# Patient Record
Sex: MALE | Race: WHITE | NOT HISPANIC OR LATINO | URBAN - METROPOLITAN AREA
[De-identification: names, ages, dates, MRNs, and addresses within clinical notes are randomized per-mention and may not be internally consistent; named-entity substitution may affect disease eponyms.]

---

## 2017-01-30 PROBLEM — 428283002 HISTORY OF POLYP OF COLON: Status: ACTIVE | Noted: 2017-01-30

## 2017-01-30 PROBLEM — 64859006 OSTEOPOROSIS: Status: ACTIVE | Noted: 2017-01-30

## 2017-01-30 PROBLEM — 398050005 DIVERTICULAR DISEASE OF COLON: Status: ACTIVE | Noted: 2017-01-30

## 2017-01-30 PROBLEM — 95570007 KIDNEY STONE: Status: ACTIVE | Noted: 2017-01-30

## 2019-07-24 PROBLEM — 13644009 HYPERCHOLESTEROLEMIA: Status: ACTIVE | Noted: 2019-07-24

## 2019-07-24 PROBLEM — 9014002 PSORIASIS: Status: ACTIVE | Noted: 2019-07-24

## 2021-02-11 ENCOUNTER — OFFICE VISIT (OUTPATIENT)
Dept: URBAN - METROPOLITAN AREA CLINIC 44 | Facility: CLINIC | Age: 71
End: 2021-02-11

## 2021-08-28 ENCOUNTER — TELEPHONE ENCOUNTER (OUTPATIENT)
Dept: URBAN - METROPOLITAN AREA CLINIC 13 | Facility: CLINIC | Age: 71
End: 2021-08-28

## 2021-08-28 RX ORDER — OMEPRAZOLE 10 MG/1
CAPSULE, DELAYED RELEASE ORAL
OUTPATIENT
End: 2018-10-15

## 2021-08-28 RX ORDER — LUBIPROSTONE 24 UG/1
CAPSULE, GELATIN COATED ORAL
OUTPATIENT
Start: 2019-12-03 | End: 2021-02-11

## 2021-08-28 RX ORDER — PANTOPRAZOLE SODIUM 40 MG/1
TABLET, DELAYED RELEASE ORAL
OUTPATIENT
Start: 2019-11-11 | End: 2021-02-11

## 2021-08-28 RX ORDER — OMEPRAZOLE 40 MG/1
CAPSULE, DELAYED RELEASE ORAL
OUTPATIENT
Start: 2017-02-01 | End: 2021-02-11

## 2021-08-29 ENCOUNTER — TELEPHONE ENCOUNTER (OUTPATIENT)
Dept: URBAN - METROPOLITAN AREA CLINIC 13 | Facility: CLINIC | Age: 71
End: 2021-08-29

## 2021-08-29 RX ORDER — PANTOPRAZOLE SODIUM 40 MG/1
TABLET, DELAYED RELEASE ORAL
Status: ACTIVE | COMMUNITY

## 2021-08-29 RX ORDER — LUBIPROSTONE 24 UG/1
CAPSULE, GELATIN COATED ORAL
Status: ACTIVE | COMMUNITY
Start: 2021-02-11

## 2021-10-20 ENCOUNTER — CLAIMS CREATED FROM THE CLAIM WINDOW (OUTPATIENT)
Dept: URBAN - METROPOLITAN AREA CLINIC 48 | Facility: CLINIC | Age: 71
End: 2021-10-20
Payer: COMMERCIAL

## 2021-10-20 ENCOUNTER — DASHBOARD ENCOUNTERS (OUTPATIENT)
Age: 71
End: 2021-10-20

## 2021-10-20 ENCOUNTER — LAB OUTSIDE AN ENCOUNTER (OUTPATIENT)
Dept: URBAN - METROPOLITAN AREA CLINIC 48 | Facility: CLINIC | Age: 71
End: 2021-10-20

## 2021-10-20 VITALS — WEIGHT: 147 LBS | BODY MASS INDEX: 22.28 KG/M2 | HEIGHT: 68 IN

## 2021-10-20 DIAGNOSIS — R10.11 RUQ PAIN: ICD-10-CM

## 2021-10-20 DIAGNOSIS — R19.4 CHANGE IN BOWEL HABITS: ICD-10-CM

## 2021-10-20 DIAGNOSIS — K58.1 IRRITABLE BOWEL SYNDROME WITH CONSTIPATION: ICD-10-CM

## 2021-10-20 DIAGNOSIS — K21.9 GASTROESOPHAGEAL REFLUX DISEASE WITHOUT ESOPHAGITIS: ICD-10-CM

## 2021-10-20 PROBLEM — 440630006: Status: ACTIVE | Noted: 2021-10-20

## 2021-10-20 PROBLEM — 301717006: Status: ACTIVE | Noted: 2021-10-20

## 2021-10-20 PROBLEM — 129851009: Status: ACTIVE | Noted: 2021-10-20

## 2021-10-20 PROBLEM — 266435005: Status: ACTIVE | Noted: 2021-10-20

## 2021-10-20 PROCEDURE — 99214 OFFICE O/P EST MOD 30 MIN: CPT | Performed by: NURSE PRACTITIONER

## 2021-10-20 PROCEDURE — 99214 OFFICE O/P EST MOD 30 MIN: CPT | Performed by: INTERNAL MEDICINE

## 2021-10-20 RX ORDER — PANTOPRAZOLE SODIUM 40 MG/1
TABLET, DELAYED RELEASE ORAL
Status: ACTIVE | COMMUNITY

## 2021-10-20 RX ORDER — LUBIPROSTONE 24 UG/1
CAPSULE, GELATIN COATED ORAL
Status: ON HOLD | COMMUNITY
Start: 2021-02-11

## 2021-10-20 NOTE — HPI-GERD
70 year old presents for colon consult via telehealth. He has had chronic RUQ pain for years. Eating can sometimes improve pain and at times it can make pain worse. The patient takes Omeprazole daily for GERD and has rare breakthrough reflux for which he takes TUMs. Denies NSAIDs or new medications. Bowel movements are daily of formed, non-bloody stool but he has incomplete evacuation. He used to take Amitiza 24 mcg but felt it to be too strong. EGD/Flex sig on 11/19 w/ erosions and diverticulosis. EGD in 2017 revealed grade c esophagitis and duodenitis. He has a history of TA in 2004 but 2013 colonoscopy showed diverticulosis, otherwise, normal.